# Patient Record
Sex: FEMALE | Race: BLACK OR AFRICAN AMERICAN | NOT HISPANIC OR LATINO | Employment: FULL TIME | URBAN - METROPOLITAN AREA
[De-identification: names, ages, dates, MRNs, and addresses within clinical notes are randomized per-mention and may not be internally consistent; named-entity substitution may affect disease eponyms.]

---

## 2020-05-26 ENCOUNTER — TELEPHONE (OUTPATIENT)
Dept: OBSTETRICS AND GYNECOLOGY | Facility: CLINIC | Age: 40
End: 2020-05-26

## 2020-05-26 NOTE — TELEPHONE ENCOUNTER
Called and spoke with patient. She having trouble sleeping at night, heartrate increases every now and then. I adv pt it sounds more like anxiety and we normally don't check hormones at her age. Pt was given Amanda Erickson NP office number to call for appointment since she doesn't have PCP. Amber Mcgovern

## 2020-05-26 NOTE — TELEPHONE ENCOUNTER
----- Message from Madonna Polanco sent at 5/26/2020  1:07 PM CDT -----  Contact: patient  Would like to consult with nurse regarding getting blood work. Patient stated she wanted to get blood work done for anxiety, hormones, and blood pressure because she isn't feeling well and she doesn't know what it could be. Please call back at [phone]

## 2020-09-23 ENCOUNTER — OFFICE VISIT (OUTPATIENT)
Dept: OBSTETRICS AND GYNECOLOGY | Facility: CLINIC | Age: 40
End: 2020-09-23
Payer: COMMERCIAL

## 2020-09-23 ENCOUNTER — TELEPHONE (OUTPATIENT)
Dept: OBSTETRICS AND GYNECOLOGY | Facility: CLINIC | Age: 40
End: 2020-09-23

## 2020-09-23 VITALS
BODY MASS INDEX: 34.37 KG/M2 | DIASTOLIC BLOOD PRESSURE: 100 MMHG | WEIGHT: 219 LBS | HEIGHT: 67 IN | SYSTOLIC BLOOD PRESSURE: 138 MMHG

## 2020-09-23 DIAGNOSIS — N89.8 VAGINAL LEUKORRHEA: Primary | ICD-10-CM

## 2020-09-23 DIAGNOSIS — N89.8 VAGINAL LEUKORRHEA: ICD-10-CM

## 2020-09-23 PROCEDURE — 3008F PR BODY MASS INDEX (BMI) DOCUMENTED: ICD-10-PCS | Mod: CPTII,S$GLB,, | Performed by: NURSE PRACTITIONER

## 2020-09-23 PROCEDURE — 3008F BODY MASS INDEX DOCD: CPT | Mod: CPTII,S$GLB,, | Performed by: NURSE PRACTITIONER

## 2020-09-23 PROCEDURE — 99214 PR OFFICE/OUTPT VISIT, EST, LEVL IV, 30-39 MIN: ICD-10-PCS | Mod: S$GLB,,, | Performed by: NURSE PRACTITIONER

## 2020-09-23 PROCEDURE — 99214 OFFICE O/P EST MOD 30 MIN: CPT | Mod: S$GLB,,, | Performed by: NURSE PRACTITIONER

## 2020-09-23 RX ORDER — TINIDAZOLE 500 MG/1
2 TABLET ORAL
Qty: 8 TABLET | Refills: 0 | Status: SHIPPED | OUTPATIENT
Start: 2020-09-23 | End: 2020-09-23 | Stop reason: SDUPTHER

## 2020-09-23 RX ORDER — TINIDAZOLE 500 MG/1
2 TABLET ORAL
Qty: 8 TABLET | Refills: 0 | Status: SHIPPED | OUTPATIENT
Start: 2020-09-23 | End: 2020-09-25

## 2020-09-23 RX ORDER — AMLODIPINE BESYLATE 5 MG/1
5 TABLET ORAL DAILY
COMMUNITY
Start: 2020-06-28 | End: 2020-09-24

## 2020-09-23 NOTE — TELEPHONE ENCOUNTER
----- Message from Syeda Almanza sent at 9/23/2020  9:31 AM CDT -----  Type:  Needs Medical Advice    Who Called: pt  Symptoms (please be specific): yeast infection   How long has patient had these symptoms:  3 days  Pharmacy name and phone #:  [facility] [address]  Would the patient rather a call back or a response via [facility]? Call back  Best Call Back Number: [phone]  Additional Information: Caller is calling in regards to having a yeast infection // pt states her PH balance is off // pt will like something to be called in //

## 2020-09-23 NOTE — TELEPHONE ENCOUNTER
----- Message from Lois Luevano sent at 9/23/2020  2:33 PM CDT -----  Contact: self  Requesting call back regarding getting prescription changed to [facility] [address] [facility]. Please call back at

## 2020-09-23 NOTE — PROGRESS NOTES
"  Subjective:       Patient ID: Natalia Reyna is a 39 y.o. female.    Chief Complaint:  Vaginal Discharge (mild, with odor; onset 2 days ago)      History of Present Illness  HPI  vaginal DC with odor  Pt reports that she has been having a vaginal odor that is fishy in nature. No real discharge. Has not tried any at home treatments.    Outpatient Medications Marked as Taking for the 9/23/20 encounter (Office Visit) with Katherine Payton NP   Medication Sig Dispense Refill    amLODIPine (NORVASC) 5 MG tablet Take 5 mg by mouth once daily.       Vitals:    09/23/20 1104   BP: (!) 138/100   Weight: 99.3 kg (219 lb)   Height: 5' 7" (1.702 m)     Social History     Socioeconomic History    Marital status: Single     Spouse name: Not on file    Number of children: Not on file    Years of education: Not on file    Highest education level: Not on file   Occupational History    Not on file   Social Needs    Financial resource strain: Not on file    Food insecurity     Worry: Not on file     Inability: Not on file    Transportation needs     Medical: Not on file     Non-medical: Not on file   Tobacco Use    Smoking status: Never Smoker   Substance and Sexual Activity    Alcohol use: Yes     Comment: occasional    Drug use: Never    Sexual activity: Yes     Partners: Male     Birth control/protection: Condom   Lifestyle    Physical activity     Days per week: Not on file     Minutes per session: Not on file    Stress: Not on file   Relationships    Social connections     Talks on phone: Not on file     Gets together: Not on file     Attends Jainism service: Not on file     Active member of club or organization: Not on file     Attends meetings of clubs or organizations: Not on file     Relationship status: Not on file   Other Topics Concern    Not on file   Social History Narrative    Not on file       GYN & OB History  Patient's last menstrual period was 09/05/2020.   Date of Last Pap: No result " found    OB History    Para Term  AB Living   2 1     1 1   SAB TAB Ectopic Multiple Live Births           1      # Outcome Date GA Lbr Yasmany/2nd Weight Sex Delivery Anes PTL Lv   2 Para 13    F CS-Unspec   VLADIMIR   1 AB                Review of Systems  Review of Systems   Constitutional: Negative for activity change, chills, fatigue, fever and unexpected weight change.   Respiratory: Negative for cough and shortness of breath.    Cardiovascular: Negative for chest pain and palpitations.   Genitourinary: Positive for vaginal odor.           Objective:    Physical Exam:   Constitutional: She appears well-developed and well-nourished.       Cardiovascular: Normal rate, regular rhythm, normal heart sounds and normal pulses.     Pulmonary/Chest: Effort normal and breath sounds normal.          Genitourinary:    Uterus normal.      Pelvic exam was performed with patient supine.   Cervix is normal. Right adnexum displays no mass, no tenderness and no fullness. Left adnexum displays no mass, no tenderness and no fullness. No erythema, tenderness, bleeding, rectocele, cystocele or unspecified prolapse of vaginal walls in the vagina.    No foreign body in the vagina.      No signs of injury in the vagina.   Labial bartholins normal.Cervix exhibits no motion tenderness, no discharge and no friability.    Genitourinary Comments: White, creamy   positive for vaginal discharge                       Assessment:        1. Vaginal leukorrhea                Plan:      Vaginal leukorrhea  -     tinidazole (TINDAMAX) 500 MG tablet; Take 4 tablets (2 g total) by mouth daily with breakfast. for 2 days  Dispense: 8 tablet; Refill: 0  -     Aerobic culture; Future; Expected date: 2020  -     Vaginosis Screen by DNA Probe; Future; Expected date: 2020      It is presumed that you vaginitis. This can cause itching, odor, burning and discharge. I will call you with your swab results. In the mean time please  start a probiotic. The probiotic will help to balance vaginal PH decreasing vaginal inflammation and boost vaginal health. Do not douche. Refrain from tub bathing at this time. The following medication has been sent to your pharmacy:  Tinidazole    Discussed with the patuent the importance of BP control and advised to get a cuff and monitor at home and call me with results. Verbalized understanding. Enc a low salt diet.     Enc to start culturelle probiotics for women

## 2020-09-23 NOTE — TELEPHONE ENCOUNTER
pts regular pharmacy is closed, would like it called out again to the [facility] instead

## 2020-09-24 ENCOUNTER — PATIENT MESSAGE (OUTPATIENT)
Dept: OBSTETRICS AND GYNECOLOGY | Facility: CLINIC | Age: 40
End: 2020-09-24

## 2020-09-24 ENCOUNTER — TELEPHONE (OUTPATIENT)
Dept: INTERNAL MEDICINE | Facility: CLINIC | Age: 40
End: 2020-09-24

## 2020-09-24 DIAGNOSIS — I10 HYPERTENSION, UNSPECIFIED TYPE: Primary | ICD-10-CM

## 2020-09-24 RX ORDER — AMLODIPINE BESYLATE 10 MG/1
10 TABLET ORAL DAILY
Qty: 30 TABLET | Refills: 11 | Status: SHIPPED | OUTPATIENT
Start: 2020-09-24 | End: 2020-09-25

## 2020-09-24 NOTE — PROGRESS NOTES
Clinic Note  9/25/2020      Subjective:       Patient ID:  Natalia is a 39 y.o. female being seen for a new visit.      Chief Complaint: Establish Care and Hypertension    HPI   Natalia is a 39 year old female in clinic to establish care with PCP. She was diagnosed with hypertension during pregnancy. She states that she checks BP at home, BP is not consistent running from 120/80s-over 140/80. GYN started her on amlodipine, recently increased to 10 mg. BP today in clinic 148/98. States she is experiencing anxiety, unable to catch deep breaths and occasional palpitations.  She does adhere to low salt diet. She does exercise regularly. States father has hypertension.    Hypertension:  Patient is here for evaluation of elevated blood pressures. Age at onset of elevated blood pressure:  33 yo. Cardiac symptoms: none. Patient denies chest pain, lower extremity edema and syncope. Cardiovascular risk factors: family history of premature cardiovascular disease. Use of agents associated with hypertension: none. History of target organ damage: none.      The following portions of the patient's history were reviewed and updated as appropriate: allergies, current medications, past family history, past medical history, past social history, past surgical history and problem list.      Family History   Family history unknown: Yes     Social History     Socioeconomic History    Marital status: Single     Spouse name: Not on file    Number of children: Not on file    Years of education: Not on file    Highest education level: Not on file   Occupational History    Not on file   Social Needs    Financial resource strain: Not on file    Food insecurity     Worry: Not on file     Inability: Not on file    Transportation needs     Medical: Not on file     Non-medical: Not on file   Tobacco Use    Smoking status: Never Smoker   Substance and Sexual Activity    Alcohol use: Yes     Comment: occasional    Drug use: Never    Sexual  activity: Yes     Partners: Male     Birth control/protection: Condom   Lifestyle    Physical activity     Days per week: Not on file     Minutes per session: Not on file    Stress: Not on file   Relationships    Social connections     Talks on phone: Not on file     Gets together: Not on file     Attends Taoism service: Not on file     Active member of club or organization: Not on file     Attends meetings of clubs or organizations: Not on file     Relationship status: Not on file   Other Topics Concern    Not on file   Social History Narrative    Not on file     Past Surgical History:   Procedure Laterality Date     SECTION       Patient Active Problem List   Diagnosis    Essential hypertension       Review of Systems   Constitutional: Negative for chills, fever, malaise/fatigue and weight loss.   HENT: Negative for congestion, ear pain and sore throat.    Respiratory: Negative for cough, sputum production, shortness of breath and wheezing.    Cardiovascular: Positive for palpitations. Negative for chest pain, orthopnea, claudication and leg swelling.   Gastrointestinal: Negative for constipation, diarrhea, nausea and vomiting.   Genitourinary: Negative for dysuria, frequency, hematuria and urgency.   Musculoskeletal: Negative for back pain, joint pain, myalgias and neck pain.   Skin: Negative for itching and rash.   Neurological: Negative for dizziness, seizures and headaches.   Psychiatric/Behavioral: Negative for depression, substance abuse and suicidal ideas. The patient is nervous/anxious and has insomnia.            Medication List with Changes/Refills   New Medications    BUSPIRONE (BUSPAR) 5 MG TAB    Take 1 tablet (5 mg total) by mouth 2 (two) times daily.    CARVEDILOL (COREG) 6.25 MG TABLET    Take 1 tablet (6.25 mg total) by mouth 2 (two) times daily with meals.   Discontinued Medications    AMLODIPINE (NORVASC) 10 MG TABLET    Take 1 tablet (10 mg total) by mouth once daily.     "TINIDAZOLE (TINDAMAX) 500 MG TABLET    Take 4 tablets (2 g total) by mouth daily with breakfast. for 2 days       Objective:      BP (!) 148/98 (BP Location: Left arm, Patient Position: Sitting)   Pulse 94   Temp 97.6 °F (36.4 °C)   Ht 5' 7" (1.702 m)   Wt 100.4 kg (221 lb 4 oz)   LMP 09/05/2020   SpO2 98%   BMI 34.65 kg/m²   Estimated body mass index is 34.65 kg/m² as calculated from the following:    Height as of this encounter: 5' 7" (1.702 m).    Weight as of this encounter: 100.4 kg (221 lb 4 oz).  Physical Exam   Constitutional: She is oriented to person, place, and time and well-developed, well-nourished, and in no distress. Vital signs are normal. She does not have a sickly appearance. No distress.   HENT:   Head: Normocephalic and atraumatic.   Cardiovascular: Normal rate, regular rhythm and normal heart sounds. Exam reveals no gallop and no friction rub.   No murmur heard.  Pulmonary/Chest: Effort normal and breath sounds normal. No respiratory distress. She has no wheezes.   Abdominal: Soft. Bowel sounds are normal. She exhibits no distension. There is no abdominal tenderness.   Neurological: She is alert and oriented to person, place, and time. Gait normal. GCS score is 15.   Skin: Skin is warm and dry. She is not diaphoretic.   Psychiatric: Mood, memory, affect and judgment normal.   Nursing note and vitals reviewed.        Assessment and Plan:   Establish care  Essential hypertension  Stop amlodipine  Coreg 6.25 mg bid  Monitor and record BP twice per day, call if BP is >140/80  Routine labs at next visit in 2 weeks  Follow up in 2 weeks and as needed        Problem List Items Addressed This Visit        Cardiac/Vascular    Essential hypertension    Relevant Medications    carvediloL (COREG) 6.25 MG tablet      Other Visit Diagnoses     Encounter to establish care    -  Primary    Anxiety        Relevant Medications    busPIRone (BUSPAR) 5 MG Tab    Laboratory exam ordered as part of routine " general medical examination              Risks, benefits, and alternatives discussed with patient, Patient verbalized understanding of discussed plan of care. Asked patient if any further questions, answered no.  Follow up:   Follow up in about 2 weeks (around 10/9/2020), or if symptoms worsen or fail to improve.     Other Orders Placed This Visit:  No orders of the defined types were placed in this encounter.          Amanda Erickson    Problem List Items Addressed This Visit        Cardiac/Vascular    Essential hypertension    Relevant Medications    carvediloL (COREG) 6.25 MG tablet      Other Visit Diagnoses     Encounter to establish care    -  Primary    Anxiety        Relevant Medications    busPIRone (BUSPAR) 5 MG Tab    Laboratory exam ordered as part of routine general medical examination

## 2020-09-25 ENCOUNTER — OFFICE VISIT (OUTPATIENT)
Dept: INTERNAL MEDICINE | Facility: CLINIC | Age: 40
End: 2020-09-25
Payer: COMMERCIAL

## 2020-09-25 VITALS
SYSTOLIC BLOOD PRESSURE: 148 MMHG | HEART RATE: 94 BPM | TEMPERATURE: 98 F | BODY MASS INDEX: 34.73 KG/M2 | DIASTOLIC BLOOD PRESSURE: 98 MMHG | HEIGHT: 67 IN | WEIGHT: 221.25 LBS | OXYGEN SATURATION: 98 %

## 2020-09-25 DIAGNOSIS — Z76.89 ENCOUNTER TO ESTABLISH CARE: Primary | ICD-10-CM

## 2020-09-25 DIAGNOSIS — Z00.00 LABORATORY EXAM ORDERED AS PART OF ROUTINE GENERAL MEDICAL EXAMINATION: ICD-10-CM

## 2020-09-25 DIAGNOSIS — I10 ESSENTIAL HYPERTENSION: ICD-10-CM

## 2020-09-25 DIAGNOSIS — F41.9 ANXIETY: ICD-10-CM

## 2020-09-25 PROCEDURE — 99203 PR OFFICE/OUTPT VISIT, NEW, LEVL III, 30-44 MIN: ICD-10-PCS | Mod: S$GLB,,, | Performed by: NURSE PRACTITIONER

## 2020-09-25 PROCEDURE — 3008F PR BODY MASS INDEX (BMI) DOCUMENTED: ICD-10-PCS | Mod: CPTII,S$GLB,, | Performed by: NURSE PRACTITIONER

## 2020-09-25 PROCEDURE — 3008F BODY MASS INDEX DOCD: CPT | Mod: CPTII,S$GLB,, | Performed by: NURSE PRACTITIONER

## 2020-09-25 PROCEDURE — 99203 OFFICE O/P NEW LOW 30 MIN: CPT | Mod: S$GLB,,, | Performed by: NURSE PRACTITIONER

## 2020-09-25 RX ORDER — CARVEDILOL 6.25 MG/1
6.25 TABLET ORAL 2 TIMES DAILY WITH MEALS
Qty: 60 TABLET | Refills: 5 | Status: SHIPPED | OUTPATIENT
Start: 2020-09-25 | End: 2021-04-05 | Stop reason: SDUPTHER

## 2020-09-25 RX ORDER — BUSPIRONE HYDROCHLORIDE 5 MG/1
5 TABLET ORAL 2 TIMES DAILY
Qty: 60 TABLET | Refills: 5 | Status: SHIPPED | OUTPATIENT
Start: 2020-09-25 | End: 2021-04-05 | Stop reason: SDUPTHER

## 2020-09-29 ENCOUNTER — PATIENT MESSAGE (OUTPATIENT)
Dept: PRIMARY CARE CLINIC | Facility: CLINIC | Age: 40
End: 2020-09-29

## 2020-09-29 ENCOUNTER — PATIENT MESSAGE (OUTPATIENT)
Dept: OBSTETRICS AND GYNECOLOGY | Facility: CLINIC | Age: 40
End: 2020-09-29

## 2020-10-20 ENCOUNTER — PATIENT MESSAGE (OUTPATIENT)
Dept: PRIMARY CARE CLINIC | Facility: CLINIC | Age: 40
End: 2020-10-20

## 2021-01-29 ENCOUNTER — OFFICE VISIT (OUTPATIENT)
Dept: OBSTETRICS AND GYNECOLOGY | Facility: CLINIC | Age: 41
End: 2021-01-29
Payer: COMMERCIAL

## 2021-01-29 VITALS
HEIGHT: 67 IN | OXYGEN SATURATION: 97 % | HEART RATE: 75 BPM | SYSTOLIC BLOOD PRESSURE: 168 MMHG | WEIGHT: 229 LBS | DIASTOLIC BLOOD PRESSURE: 109 MMHG | BODY MASS INDEX: 35.94 KG/M2

## 2021-01-29 DIAGNOSIS — I10 HYPERTENSION, UNSPECIFIED TYPE: ICD-10-CM

## 2021-01-29 DIAGNOSIS — Z01.419 GYNECOLOGIC EXAM NORMAL: Primary | ICD-10-CM

## 2021-01-29 PROCEDURE — 3008F PR BODY MASS INDEX (BMI) DOCUMENTED: ICD-10-PCS | Mod: CPTII,S$GLB,, | Performed by: NURSE PRACTITIONER

## 2021-01-29 PROCEDURE — 99395 PREV VISIT EST AGE 18-39: CPT | Mod: S$GLB,,, | Performed by: NURSE PRACTITIONER

## 2021-01-29 PROCEDURE — 3077F PR MOST RECENT SYSTOLIC BLOOD PRESSURE >= 140 MM HG: ICD-10-PCS | Mod: CPTII,S$GLB,, | Performed by: NURSE PRACTITIONER

## 2021-01-29 PROCEDURE — 3008F BODY MASS INDEX DOCD: CPT | Mod: CPTII,S$GLB,, | Performed by: NURSE PRACTITIONER

## 2021-01-29 PROCEDURE — 3080F PR MOST RECENT DIASTOLIC BLOOD PRESSURE >= 90 MM HG: ICD-10-PCS | Mod: CPTII,S$GLB,, | Performed by: NURSE PRACTITIONER

## 2021-01-29 PROCEDURE — 99395 PR PREVENTIVE VISIT,EST,18-39: ICD-10-PCS | Mod: S$GLB,,, | Performed by: NURSE PRACTITIONER

## 2021-01-29 PROCEDURE — 3077F SYST BP >= 140 MM HG: CPT | Mod: CPTII,S$GLB,, | Performed by: NURSE PRACTITIONER

## 2021-01-29 PROCEDURE — 3080F DIAST BP >= 90 MM HG: CPT | Mod: CPTII,S$GLB,, | Performed by: NURSE PRACTITIONER

## 2021-02-02 ENCOUNTER — PATIENT MESSAGE (OUTPATIENT)
Dept: FAMILY MEDICINE | Facility: CLINIC | Age: 41
End: 2021-02-02

## 2021-04-05 ENCOUNTER — OFFICE VISIT (OUTPATIENT)
Dept: FAMILY MEDICINE | Facility: CLINIC | Age: 41
End: 2021-04-05
Payer: COMMERCIAL

## 2021-04-05 VITALS
DIASTOLIC BLOOD PRESSURE: 98 MMHG | RESPIRATION RATE: 20 BRPM | HEIGHT: 67 IN | WEIGHT: 233 LBS | BODY MASS INDEX: 36.57 KG/M2 | TEMPERATURE: 98 F | OXYGEN SATURATION: 98 % | SYSTOLIC BLOOD PRESSURE: 171 MMHG | HEART RATE: 87 BPM

## 2021-04-05 DIAGNOSIS — I10 ESSENTIAL HYPERTENSION: ICD-10-CM

## 2021-04-05 DIAGNOSIS — Z00.00 LABORATORY EXAM ORDERED AS PART OF ROUTINE GENERAL MEDICAL EXAMINATION: Primary | ICD-10-CM

## 2021-04-05 DIAGNOSIS — F41.9 ANXIETY: ICD-10-CM

## 2021-04-05 LAB
ABS NRBC COUNT: 0 X 10 3/UL (ref 0–0.01)
ABSOLUTE BASOPHIL: 0.07 X 10 3/UL (ref 0–0.22)
ABSOLUTE EOSINOPHIL: 0.28 X 10 3/UL (ref 0.04–0.54)
ABSOLUTE IMMATURE GRAN: 0.03 X 10 3/UL (ref 0–0.04)
ABSOLUTE LYMPHOCYTE: 2.68 X 10 3/UL (ref 0.86–4.75)
ABSOLUTE MONOCYTE: 0.43 X 10 3/UL (ref 0.22–1.08)
ALBUMIN SERPL-MCNC: 4.6 G/DL (ref 3.5–5.2)
ALBUMIN/GLOB SERPL ELPH: 1.5 {RATIO} (ref 1–2.7)
ALP ISOS SERPL LEV INH-CCNC: 53 U/L (ref 35–105)
ALT (SGPT): 25 U/L (ref 0–33)
ANION GAP SERPL CALC-SCNC: 6 MMOL/L (ref 8–17)
AST SERPL-CCNC: 18 U/L (ref 0–32)
BASOPHILS NFR BLD: 0.6 % (ref 0.2–1.2)
BILIRUBIN, TOTAL: 0.76 MG/DL (ref 0–1.2)
BUN/CREAT SERPL: 9.1 (ref 6–20)
CALCIUM SERPL-MCNC: 9.6 MG/DL (ref 8.6–10.2)
CARBON DIOXIDE, CO2: 28 MMOL/L (ref 22–29)
CHLORIDE: 106 MMOL/L (ref 98–107)
CHOLEST SERPL-MSCNC: 183 MG/DL (ref 100–200)
CREAT SERPL-MCNC: 0.9 MG/DL (ref 0.5–0.9)
EOSINOPHIL NFR BLD: 2.6 % (ref 0.7–7)
GFR ESTIMATION: 70.07
GLOBULIN: 3.1 G/DL (ref 1.5–4.5)
GLUCOSE: 128 MG/DL (ref 74–106)
HCT VFR BLD AUTO: 44.8 % (ref 37–47)
HDLC SERPL-MCNC: 55 MG/DL
HGB BLD-MCNC: 13.7 G/DL (ref 12–16)
IMMATURE GRANULOCYTES: 0.3 % (ref 0–0.5)
LDL/HDL RATIO: 2.1 (ref 1–3)
LDLC SERPL CALC-MCNC: 113 MG/DL (ref 0–100)
LYMPHOCYTES NFR BLD: 24.7 % (ref 19.3–53.1)
MCH RBC QN AUTO: 28.3 PG (ref 27–32)
MCHC RBC AUTO-ENTMCNC: 30.6 G/DL (ref 32–36)
MCV RBC AUTO: 92.6 FL (ref 82–100)
MONOCYTES NFR BLD: 4 % (ref 4.7–12.5)
NEUTROPHILS # BLD AUTO: 7.37 X 10 3/UL (ref 2.15–7.56)
NEUTROPHILS NFR BLD: 67.8 %
NUCLEATED RED BLOOD CELLS: 0 /100 WBC (ref 0–0.2)
PLATELET # BLD AUTO: 348 X 10 3/UL (ref 135–400)
POTASSIUM: 3.8 MMOL/L (ref 3.5–5.1)
PROT SNV-MCNC: 7.7 G/DL (ref 6.4–8.3)
RBC # BLD AUTO: 4.84 X 10 6/UL (ref 4.2–5.4)
RDW-SD: 44.4 FL (ref 37–54)
SODIUM: 140 MMOL/L (ref 136–145)
TRIGL SERPL-MCNC: 75 MG/DL (ref 0–150)
TSH W/REFLEX TO FT4: 2.5 UIU/ML (ref 0.27–4.2)
UREA NITROGEN (BUN): 8.2 MG/DL (ref 6–20)
WBC # BLD: 10.86 X 10 3/UL (ref 4.3–10.8)

## 2021-04-05 PROCEDURE — 99395 PREV VISIT EST AGE 18-39: CPT | Mod: S$GLB,,, | Performed by: NURSE PRACTITIONER

## 2021-04-05 PROCEDURE — 99395 PR PREVENTIVE VISIT,EST,18-39: ICD-10-PCS | Mod: S$GLB,,, | Performed by: NURSE PRACTITIONER

## 2021-04-05 RX ORDER — BUSPIRONE HYDROCHLORIDE 5 MG/1
5 TABLET ORAL 3 TIMES DAILY
Qty: 90 TABLET | Refills: 5 | Status: SHIPPED | OUTPATIENT
Start: 2021-04-05 | End: 2021-10-02

## 2021-04-05 RX ORDER — CARVEDILOL 12.5 MG/1
12.5 TABLET ORAL 2 TIMES DAILY WITH MEALS
Qty: 60 TABLET | Refills: 5 | Status: SHIPPED | OUTPATIENT
Start: 2021-04-05 | End: 2021-10-02

## 2021-04-06 ENCOUNTER — TELEPHONE (OUTPATIENT)
Dept: FAMILY MEDICINE | Facility: CLINIC | Age: 41
End: 2021-04-06

## 2021-04-06 ENCOUNTER — PATIENT MESSAGE (OUTPATIENT)
Dept: FAMILY MEDICINE | Facility: CLINIC | Age: 41
End: 2021-04-06

## 2021-04-26 ENCOUNTER — TELEPHONE (OUTPATIENT)
Dept: FAMILY MEDICINE | Facility: CLINIC | Age: 41
End: 2021-04-26